# Patient Record
Sex: FEMALE | Race: BLACK OR AFRICAN AMERICAN | NOT HISPANIC OR LATINO | Employment: FULL TIME | URBAN - METROPOLITAN AREA
[De-identification: names, ages, dates, MRNs, and addresses within clinical notes are randomized per-mention and may not be internally consistent; named-entity substitution may affect disease eponyms.]

---

## 2017-01-03 ENCOUNTER — OFFICE VISIT (OUTPATIENT)
Dept: ORTHOPEDIC SURGERY | Facility: CLINIC | Age: 47
End: 2017-01-03

## 2017-01-03 VITALS — BODY MASS INDEX: 27.23 KG/M2 | WEIGHT: 148 LBS | HEIGHT: 62 IN | TEMPERATURE: 98.1 F

## 2017-01-03 DIAGNOSIS — M25.521 RIGHT ELBOW PAIN: ICD-10-CM

## 2017-01-03 DIAGNOSIS — S82.891D ANKLE FRACTURE, RIGHT, CLOSED, WITH ROUTINE HEALING, SUBSEQUENT ENCOUNTER: Primary | ICD-10-CM

## 2017-01-03 PROCEDURE — 73610 X-RAY EXAM OF ANKLE: CPT | Performed by: ORTHOPAEDIC SURGERY

## 2017-01-03 PROCEDURE — 73070 X-RAY EXAM OF ELBOW: CPT | Performed by: ORTHOPAEDIC SURGERY

## 2017-01-03 PROCEDURE — 99024 POSTOP FOLLOW-UP VISIT: CPT | Performed by: ORTHOPAEDIC SURGERY

## 2017-01-03 NOTE — PROGRESS NOTES
"Patient: Teresita Romo  YOB: 1970 46 y.o. female  Medical Record Number: [id number]    Chief Complaint:   Chief Complaint   Patient presents with   • Left Ankle - Follow-up, Pain   • Right Elbow - Follow-up, Pain       History of Present Illness:Teresita Romo is a 46 y.o. female who presents for follow-up of  Right elbow, and left ankle. Overall doing well minimal discomfort in either    Allergies: No Known Allergies    Medications:   Current Outpatient Prescriptions   Medication Sig Dispense Refill   • cetirizine (ZyrTEC) 10 MG tablet TK 1 T PO D PRN  2   • montelukast (SINGULAIR) 10 MG tablet TK 1 T PO ONCE A DAY  1   • Multiple Vitamin (MULTIVITAMIN) tablet Take 1 tablet by mouth Daily.     • valsartan-hydrochlorothiazide (DIOVAN HCT) 160-12.5 MG per tablet Take 1 tablet by mouth Daily.     • estradiol (ESTRACE VAGINAL) 0.1 MG/GM vaginal cream 0.5gms twice/wk after q hs X 1 wk. 1 each 3   • ondansetron ODT (ZOFRAN-ODT) 4 MG disintegrating tablet Take 1 tablet by mouth Every 6 (Six) Hours As Needed for nausea or vomiting. 16 tablet 0   • oxyCODONE-acetaminophen (PERCOCET) 5-325 MG per tablet Take 1 tablet by mouth Every 4 (Four) Hours As Needed for moderate pain (4-6). 16 tablet 0   • sulfamethoxazole-trimethoprim (BACTRIM,SEPTRA) 400-80 MG tablet q 12 hrs 14 tablet 0     No current facility-administered medications for this visit.          The following portions of the patient's history were reviewed and updated as appropriate: allergies, current medications, past family history, past medical history, past social history, past surgical history and problem list.    Review of Systems:   A 14 point review of systems was performed. All systems negative except pertinent positives/negative listed in HPI above    Physical Exam:   Vitals:    01/03/17 1635   Temp: 98.1 °F (36.7 °C)   TempSrc: Temporal Artery    Weight: 148 lb (67.1 kg)   Height: 62\" (157.5 cm)       General: A and O x 3, ASA, " NAD    SCLERA:    Normal    DENTITION:   Normal  No ankle tenderness to palpation  Elbow full ROM no tenderness      Radiology: X-rays AP and lateral of the right elbow ordered and reviewed for evaluation of elbow pain which show some callus about the radial neck consistent with a healed fracture with good alignment.  Callus is new in comparison with previous films from a month ago.    X-rays AP lateral and mortise view of the left ankle were ordered and reviewed for evaluation of ankle fracture which shows good alignment.  There unchanged in comparison with previous films    Assessment/Plan:  Right elbow radial neck fracture healed.  Activity as tolerated    Left ankle fibula fracture I will place her in an Aircast for the next month she can weight-bear as tolerated.  I will see her back in 2 months with repeat x-rays of the left ankle we may consider physical therapy at that time.      Cal Sylvester MD  1/3/2017

## 2017-01-03 NOTE — MR AVS SNAPSHOT
Teresita Romo   1/3/2017 3:45 PM   Office Visit    Dept Phone:  [phone]   Encounter #:  [id number]    Provider:  Cal Sylvester MD   Department:  [facility]                Your Full Care Plan              Your Updated Medication List          This list is accurate as of: 1/3/17  5:32 PM.  Always use your most recent med list.                cetirizine 10 MG tablet   Commonly known as:  zyrTEC       DIOVAN -12.5 MG per tablet   Generic drug:  valsartan-hydrochlorothiazide       estradiol 0.1 MG/GM vaginal cream   Commonly known as:  ESTRACE VAGINAL   0.5gms twice/wk after q hs X 1 wk.       montelukast 10 MG tablet   Commonly known as:  SINGULAIR       multivitamin tablet       ondansetron ODT 4 MG disintegrating tablet   Commonly known as:  ZOFRAN-ODT   Take 1 tablet by mouth Every 6 (Six) Hours As Needed for nausea or vomiting.       oxyCODONE-acetaminophen 5-325 MG per tablet   Commonly known as:  PERCOCET   Take 1 tablet by mouth Every 4 (Four) Hours As Needed for moderate pain (4-6).       sulfamethoxazole-trimethoprim 400-80 MG tablet   Commonly known as:  BACTRIM,SEPTRA   q 12 hrs               We Performed the Following     XR Ankle 3+ View Left     XR Elbow 2 View Right       You Were Diagnosed With        Codes Comments    Ankle fracture, right, closed, with routine healing, subsequent encounter    -  Primary ICD-10-CM: S82.891D  ICD-9-CM: V54.19     Right elbow pain     ICD-10-CM: M25.521  ICD-9-CM: 719.42       Instructions     None    Patient Instructions History      Upcoming Appointments     Visit Type Date Time Department    FOLLOW UP 1/3/2017  3:45 PM [facility]    FOLLOW UP 3/3/2017  1:30 PM [facility]      MyChart Signup     [facility] allows you to send messages to your doctor, view your test results, renew your prescriptions, schedule appointments, and more. To sign up, go to  [email or web address] and click on the Sign Up Now link in the New User? box. Enter your [employer] Activation Code exactly as it appears below along with the last four digits of your Social Security Number and your Date of Birth () to complete the sign-up process. If you do not sign up before the expiration date, you must request a new code.    [employer] Activation Code: [email or web address]  Expires: 2017  5:32 PM    If you have questions, you can email [email or web address] or call [phone] to talk to our [employer] staff. Remember, [employer] is NOT to be used for urgent needs. For medical emergencies, dial 911.               Other Info from Your Visit           Your Appointments     Mar 03, 2017  1:30 PM EST   Follow Up with Cal Sylvester MD   [facility] MED[facility] (--)    [address] [place] [address]   [phone]           Arrive 15 minutes prior to appointment.              Allergies     No Known Allergies      Reason for Visit     Left Ankle - Follow-up, Pain     Right Elbow - Follow-up, Pain           Vital Signs     Temperature Height Weight Last Menstrual Period Body Mass Index Smoking Status    98.1 °F (36.7 °C) (Temporal Artery ) 62\" (157.5 cm) 148 lb (67.1 kg) (LMP Unknown) 27.07 kg/m2 Never Smoker      Problems and Diagnoses Noted     Fracture of right ankle    -  Primary    Right elbow pain          Results     XR Ankle 3+ View Left           XR Elbow 2 View Right               "

## 2017-03-14 ENCOUNTER — OFFICE VISIT (OUTPATIENT)
Dept: ORTHOPEDIC SURGERY | Facility: CLINIC | Age: 47
End: 2017-03-14

## 2017-03-14 VITALS — WEIGHT: 143 LBS | TEMPERATURE: 98.5 F | BODY MASS INDEX: 26.31 KG/M2 | HEIGHT: 62 IN

## 2017-03-14 DIAGNOSIS — S82.891D ANKLE FRACTURE, RIGHT, CLOSED, WITH ROUTINE HEALING, SUBSEQUENT ENCOUNTER: Primary | ICD-10-CM

## 2017-03-14 PROCEDURE — 73610 X-RAY EXAM OF ANKLE: CPT | Performed by: ORTHOPAEDIC SURGERY

## 2017-03-14 PROCEDURE — 99024 POSTOP FOLLOW-UP VISIT: CPT | Performed by: ORTHOPAEDIC SURGERY

## 2017-03-14 NOTE — PROGRESS NOTES
Patient: Teresita Romo  YOB: 1970 46 y.o. female  Medical Record Number: [id number]    Chief Complaints:   Chief Complaint   Patient presents with   • Right Elbow - Follow-up   • Left Ankle - Follow-up       History of Present Illness:Teresita Romo is a 46 y.o. female who presents for follow-up of  right elbow and left ankle.  She is about 3 months out from in the left ankle fracture and right elbow injury.  She has some stiffness within the ankle but overall doing well with minimal pain.  She has been out of her boot for about a month now.    Allergies: No Known Allergies    Medications:   Current Outpatient Prescriptions   Medication Sig Dispense Refill   • cetirizine (ZyrTEC) 10 MG tablet TK 1 T PO D PRN  2   • estradiol (ESTRACE VAGINAL) 0.1 MG/GM vaginal cream 0.5gms twice/wk after q hs X 1 wk. 1 each 3   • montelukast (SINGULAIR) 10 MG tablet TK 1 T PO ONCE A DAY  1   • Multiple Vitamin (MULTIVITAMIN) tablet Take 1 tablet by mouth Daily.     • ondansetron ODT (ZOFRAN-ODT) 4 MG disintegrating tablet Take 1 tablet by mouth Every 6 (Six) Hours As Needed for nausea or vomiting. 16 tablet 0   • oxyCODONE-acetaminophen (PERCOCET) 5-325 MG per tablet Take 1 tablet by mouth Every 4 (Four) Hours As Needed for moderate pain (4-6). 16 tablet 0   • sulfamethoxazole-trimethoprim (BACTRIM,SEPTRA) 400-80 MG tablet q 12 hrs 14 tablet 0   • valsartan-hydrochlorothiazide (DIOVAN HCT) 160-12.5 MG per tablet Take 1 tablet by mouth Daily.       No current facility-administered medications for this visit.          The following portions of the patient's history were reviewed and updated as appropriate: allergies, current medications, past family history, past medical history, past social history, past surgical history and problem list.    Review of Systems:   A 14 point review of systems was performed. All systems negative except pertinent positives/negative listed in HPI above    Physical Exam:  "  Vitals:    03/14/17 1606   Temp: 98.5 °F (36.9 °C)   Weight: 143 lb (64.9 kg)   Height: 62\" (157.5 cm)       General: A and O x 3, ASA, NAD    SCLERA:    Normal    DENTITION:   Normal there is no tenderness about the distal left fibula ankle motion is supple.  Mild tenderness to palpation about the peroneal tendons in the midcalf region.  The elbow shows full range of motion without irritability    Radiology:  Xrays 3views left ankle (ap,lateral, or mortise) were ordered and reviewed for follow-up of left ankle fracture demonstrating a healed distal fibula fracture with good alignment of the mortise.  A comparison to previous films there has been interval healing.      Assessment/Plan:  Healed left ankle fracture activities as tolerated.  She has mild stiffness throughout the tendons have recommended continued stretching exercises    Healed right elbow strain  "

## 2017-06-30 ENCOUNTER — OFFICE VISIT (OUTPATIENT)
Dept: OBSTETRICS AND GYNECOLOGY | Facility: CLINIC | Age: 47
End: 2017-06-30

## 2017-06-30 ENCOUNTER — APPOINTMENT (OUTPATIENT)
Dept: WOMENS IMAGING | Facility: HOSPITAL | Age: 47
End: 2017-06-30

## 2017-06-30 ENCOUNTER — PROCEDURE VISIT (OUTPATIENT)
Dept: OBSTETRICS AND GYNECOLOGY | Facility: CLINIC | Age: 47
End: 2017-06-30

## 2017-06-30 VITALS
BODY MASS INDEX: 26.79 KG/M2 | DIASTOLIC BLOOD PRESSURE: 90 MMHG | SYSTOLIC BLOOD PRESSURE: 136 MMHG | HEART RATE: 76 BPM | HEIGHT: 62 IN | WEIGHT: 145.6 LBS

## 2017-06-30 DIAGNOSIS — Z12.31 VISIT FOR SCREENING MAMMOGRAM: ICD-10-CM

## 2017-06-30 DIAGNOSIS — Z01.419 WELL WOMAN EXAM WITH ROUTINE GYNECOLOGICAL EXAM: Primary | ICD-10-CM

## 2017-06-30 DIAGNOSIS — Z12.31 VISIT FOR SCREENING MAMMOGRAM: Primary | ICD-10-CM

## 2017-06-30 PROCEDURE — 99396 PREV VISIT EST AGE 40-64: CPT | Performed by: OBSTETRICS & GYNECOLOGY

## 2017-06-30 PROCEDURE — 77067 SCR MAMMO BI INCL CAD: CPT | Performed by: OBSTETRICS & GYNECOLOGY

## 2017-06-30 PROCEDURE — 77067 SCR MAMMO BI INCL CAD: CPT | Performed by: RADIOLOGY

## 2017-06-30 RX ORDER — IBUPROFEN 600 MG/1
TABLET ORAL
Refills: 0 | COMMUNITY
Start: 2017-06-20 | End: 2018-08-22

## 2017-06-30 RX ORDER — TRAZODONE HYDROCHLORIDE 50 MG/1
TABLET ORAL
Refills: 1 | COMMUNITY
Start: 2017-04-26

## 2017-06-30 RX ORDER — FLUTICASONE PROPIONATE 50 MCG
SPRAY, SUSPENSION (ML) NASAL
Refills: 0 | COMMUNITY
Start: 2017-04-26

## 2017-06-30 RX ORDER — ASPIRIN 81 MG
TABLET, DELAYED RELEASE (ENTERIC COATED) ORAL
Refills: 0 | COMMUNITY
Start: 2017-06-19 | End: 2018-08-22

## 2017-06-30 RX ORDER — PREDNISONE 10 MG/1
TABLET ORAL
Refills: 0 | COMMUNITY
Start: 2017-06-26 | End: 2018-08-22

## 2017-06-30 NOTE — PROGRESS NOTES
Jeanne Romo is a 46 y.o. female     Cc: Annual    Last annual 2016  Last pap 2014  Last mammogram 2014    History of Present Illness: reports no particular gyn problems or concerns.    The following portions of the patient's history were reviewed and updated as appropriate: allergies, current medications, past family history, past medical history, past social history, past surgical history and problem list.    Review of Systems   Constitutional: Negative for fatigue and fever.   HENT: Negative for congestion.    Eyes: Negative for visual disturbance.   Respiratory: Negative for chest tightness and shortness of breath.    Cardiovascular: Negative for chest pain, palpitations and leg swelling.   Gastrointestinal: Negative for abdominal pain and blood in stool.   Endocrine: Negative for heat intolerance.   Genitourinary: Negative for difficulty urinating, dysuria, frequency, genital sores, hematuria, menstrual problem, pelvic pain, urgency, vaginal bleeding, vaginal discharge and vaginal pain.   Musculoskeletal: Negative for back pain.   Skin: Negative for color change and rash.   Neurological: Negative for headaches.   Psychiatric/Behavioral: Negative for sleep disturbance.       Past Medical History:   Diagnosis Date   • Hypertension      Past Surgical History:   Procedure Laterality Date   • HYSTERECTOMY       OB History      Para Term  AB TAB SAB Ectopic Multiple Living    3 3 3       3        Menstrual History:  OB History      Para Term  AB TAB SAB Ectopic Multiple Living    3 3 3       3         No LMP recorded (lmp unknown). Patient has had a hysterectomy.       Family History   Problem Relation Age of Onset   • Breast cancer Maternal Aunt      History   Smoking Status   • Never Smoker   Smokeless Tobacco   • Not on file     History   Alcohol Use   • Yes       Objective   Physical Exam   Constitutional: She is oriented to  person, place, and time. She appears well-developed and well-nourished.   HENT:   Head: Normocephalic and atraumatic.   Right Ear: External ear normal.   Left Ear: External ear normal.   Nose: Nose normal.   Eyes: EOM are normal. Pupils are equal, round, and reactive to light.   Neck: Normal range of motion. Neck supple. No thyromegaly present.   Cardiovascular: Normal rate, regular rhythm and normal heart sounds.    No murmur heard.  Pulmonary/Chest: Effort normal and breath sounds normal. She has no wheezes. She has no rales. She exhibits no tenderness. Right breast exhibits no inverted nipple, no mass, no nipple discharge, no skin change and no tenderness. Left breast exhibits no inverted nipple, no mass, no nipple discharge, no skin change and no tenderness. There is no breast swelling.   Abdominal: Soft. Bowel sounds are normal. She exhibits no distension and no mass. There is no tenderness. Hernia confirmed negative in the right inguinal area and confirmed negative in the left inguinal area.   Genitourinary: No breast tenderness. Pelvic exam was performed with patient supine. There is no rash, tenderness or lesion on the right labia. There is no rash, tenderness or lesion on the left labia. Right adnexum displays no mass and no tenderness. Left adnexum displays no mass and no tenderness. No erythema, tenderness or bleeding in the vagina. No vaginal discharge found.   Genitourinary Comments: S/P hyst.   Musculoskeletal: Normal range of motion. She exhibits no edema.   Neurological: She is alert and oriented to person, place, and time.   Skin: Skin is warm and dry. No rash noted.   Psychiatric: She has a normal mood and affect. Judgment normal.   Nursing note and vitals reviewed.        Assessment/Plan   Teresita was seen today for annual exam.    Diagnoses and all orders for this visit:    Well woman exam with routine gynecological exam    Visit for screening mammogram      Will continue with annual WWE's, annual  mammography, SBE's, and daily calcium + D

## 2017-07-07 ENCOUNTER — TELEPHONE (OUTPATIENT)
Dept: OBSTETRICS AND GYNECOLOGY | Facility: CLINIC | Age: 47
End: 2017-07-07

## 2017-07-07 NOTE — TELEPHONE ENCOUNTER
Call patient and let her know her mammogram looks good and she needs to continue yearly screening mammograms.

## 2018-08-22 ENCOUNTER — OFFICE VISIT (OUTPATIENT)
Dept: OBSTETRICS AND GYNECOLOGY | Facility: CLINIC | Age: 48
End: 2018-08-22

## 2018-08-22 ENCOUNTER — APPOINTMENT (OUTPATIENT)
Dept: WOMENS IMAGING | Facility: HOSPITAL | Age: 48
End: 2018-08-22

## 2018-08-22 ENCOUNTER — PROCEDURE VISIT (OUTPATIENT)
Dept: OBSTETRICS AND GYNECOLOGY | Facility: CLINIC | Age: 48
End: 2018-08-22

## 2018-08-22 VITALS
BODY MASS INDEX: 25.4 KG/M2 | DIASTOLIC BLOOD PRESSURE: 85 MMHG | HEART RATE: 69 BPM | SYSTOLIC BLOOD PRESSURE: 134 MMHG | WEIGHT: 138 LBS | HEIGHT: 62 IN

## 2018-08-22 DIAGNOSIS — Z12.11 COLON CANCER SCREENING: ICD-10-CM

## 2018-08-22 DIAGNOSIS — Z01.419 PAP SMEAR, AS PART OF ROUTINE GYNECOLOGICAL EXAMINATION: Primary | ICD-10-CM

## 2018-08-22 DIAGNOSIS — Z12.31 VISIT FOR SCREENING MAMMOGRAM: Primary | ICD-10-CM

## 2018-08-22 DIAGNOSIS — N95.1 MENOPAUSAL SYMPTOMS: ICD-10-CM

## 2018-08-22 DIAGNOSIS — Z01.419 ENCOUNTER FOR GYNECOLOGICAL EXAMINATION WITHOUT ABNORMAL FINDING: ICD-10-CM

## 2018-08-22 LAB — HEMOCCULT STL QL IA: NEGATIVE

## 2018-08-22 PROCEDURE — 77067 SCR MAMMO BI INCL CAD: CPT | Performed by: OBSTETRICS & GYNECOLOGY

## 2018-08-22 PROCEDURE — 82274 ASSAY TEST FOR BLOOD FECAL: CPT | Performed by: OBSTETRICS & GYNECOLOGY

## 2018-08-22 PROCEDURE — 99396 PREV VISIT EST AGE 40-64: CPT | Performed by: OBSTETRICS & GYNECOLOGY

## 2018-08-22 PROCEDURE — 77067 SCR MAMMO BI INCL CAD: CPT | Performed by: RADIOLOGY

## 2018-08-22 RX ORDER — LOSARTAN POTASSIUM AND HYDROCHLOROTHIAZIDE 12.5; 5 MG/1; MG/1
TABLET ORAL DAILY
Refills: 1 | COMMUNITY
Start: 2018-07-27 | End: 2020-06-24

## 2018-08-22 NOTE — PROGRESS NOTES
Jeanne Romo is a 47 y.o. female  3, Para 3 AB 0, Living 3.  Last annual 1 year, last pap 3 years, last mammogram today, last colonoscopy 0.  Cc: Annual exam  History of Present Illness  Is complaining of hot flashes and other menopausal symptoms.  She had a short-term usage of HRT.  Discontinued its use.  Does complain of some vaginal dryness with some relief with lubricants.  Not currently interested in HRT  The following portions of the patient's history were reviewed and updated as appropriate: allergies, current medications, past family history, past medical history, past social history, past surgical history and problem list.    Review of Systems   Genitourinary:        See history of present illness   All other systems reviewed and are negative.        Past Medical History:   Diagnosis Date   • Asthma    • Hypertension      Menstrual History:  OB History      Para Term  AB Living    3 3 3     3    SAB TAB Ectopic Molar Multiple Live Births                        Menarche age: 11  No LMP recorded (lmp unknown). Patient has had a hysterectomy.       Past Surgical History:   Procedure Laterality Date   • BILATERAL OOPHORECTOMY     • TOTAL ABDOMINAL HYSTERECTOMY       OB History      Para Term  AB Living    3 3 3     3    SAB TAB Ectopic Molar Multiple Live Births                       Family History   Problem Relation Age of Onset   • Breast cancer Maternal Aunt      History   Smoking Status   • Never Smoker   Smokeless Tobacco   • Never Used     History   Alcohol Use   • Yes     Comment: social     Health Maintenance   Topic Date Due   • ANNUAL PHYSICAL  1971   • TDAP/TD VACCINES (1 - Tdap) 1987   • PAP SMEAR  2017   • COLONOSCOPY  2018   • INFLUENZA VACCINE  2018   • MAMMOGRAM  2019       Current Outpatient Prescriptions:   •  cetirizine (ZyrTEC) 10 MG tablet, TK 1 T PO D PRN, Disp: , Rfl: 2  •  fluticasone  "(FLONASE) 50 MCG/ACT nasal spray, U 1 SPRAY IEN D, Disp: , Rfl: 0  •  losartan-hydrochlorothiazide (HYZAAR) 50-12.5 MG per tablet, Take  by mouth Daily., Disp: , Rfl: 1  •  montelukast (SINGULAIR) 10 MG tablet, TK 1 T PO ONCE A DAY, Disp: , Rfl: 1  •  traZODone (DESYREL) 50 MG tablet, TK 1 T PO QD HS, Disp: , Rfl: 1  Sexual History: Active  STD: Negative     Objective   Vitals:    08/22/18 1346   BP: 134/85   Pulse: 69   Weight: 62.6 kg (138 lb)   Height: 157.5 cm (62\")     Physical Exam   Constitutional: She is oriented to person, place, and time. She appears well-developed and well-nourished.   HENT:   Head: Normocephalic.   Eyes: Pupils are equal, round, and reactive to light.   Neck: Normal range of motion. No thyromegaly present.   Cardiovascular: Normal rate, regular rhythm, normal heart sounds and intact distal pulses.    Pulmonary/Chest: Effort normal and breath sounds normal. No respiratory distress. She exhibits no tenderness. Right breast exhibits no inverted nipple, no mass, no nipple discharge, no skin change and no tenderness. Left breast exhibits no inverted nipple, no mass, no nipple discharge, no skin change and no tenderness. Breasts are symmetrical.   Abdominal: Soft. Bowel sounds are normal. Hernia confirmed negative in the right inguinal area and confirmed negative in the left inguinal area.   Genitourinary: Rectum normal and vagina normal. Rectal exam shows no external hemorrhoid, no internal hemorrhoid, no fissure, no mass, no tenderness, anal tone normal and guaiac negative stool. No breast tenderness or discharge. Pelvic exam was performed with patient supine. There is no rash, tenderness, lesion or injury on the right labia. There is no rash, tenderness, lesion or injury on the left labia. Right adnexum displays no mass, no tenderness and no fullness. Left adnexum displays no mass, no tenderness and no fullness.   Genitourinary Comments: Cervix uterus and adnexa are surgically absent "   Lymphadenopathy:     She has no cervical adenopathy.        Right: No inguinal adenopathy present.        Left: No inguinal adenopathy present.   Neurological: She is alert and oriented to person, place, and time. She has normal reflexes.   Skin: Skin is warm and dry.   Psychiatric: She has a normal mood and affect. Her behavior is normal. Judgment and thought content normal.         Assessment/Plan   Teresita was seen today for gynecologic exam.    Diagnoses and all orders for this visit:    Pap smear, as part of routine gynecological examination  -     IGP, Apt HPV,rfx 16 / 18,45    Colon cancer screening  -     POC FECAL OCCULT BLOOD BY IMMUNOASSAY    Menopausal symptoms    Encounter for gynecological examination without abnormal finding    Patient was counseled about breast self-examination, mammograms, colonoscopy, menopausal hormone replacement (declined)

## 2018-08-25 LAB
CYTOLOGIST CVX/VAG CYTO: NORMAL
CYTOLOGY CVX/VAG DOC THIN PREP: NORMAL
DX ICD CODE: NORMAL
HIV 1 & 2 AB SER-IMP: NORMAL
HPV I/H RISK 4 DNA CVX QL PROBE+SIG AMP: NEGATIVE
OTHER STN SPEC: NORMAL
PATH REPORT.FINAL DX SPEC: NORMAL
STAT OF ADQ CVX/VAG CYTO-IMP: NORMAL

## 2018-08-27 ENCOUNTER — TELEPHONE (OUTPATIENT)
Dept: OBSTETRICS AND GYNECOLOGY | Facility: CLINIC | Age: 48
End: 2018-08-27

## 2018-08-27 NOTE — TELEPHONE ENCOUNTER
----- Message from Edwin Goodman MD sent at 8/27/2018  2:20 PM EDT -----  Tell the patient her mammogram was normal  ----- Message -----  From: Interface, Scans Incoming  Sent: 8/27/2018   1:58 PM  To: Edwin Goodman MD

## 2019-11-15 ENCOUNTER — PROCEDURE VISIT (OUTPATIENT)
Dept: OBSTETRICS AND GYNECOLOGY | Facility: CLINIC | Age: 49
End: 2019-11-15

## 2019-11-15 ENCOUNTER — APPOINTMENT (OUTPATIENT)
Dept: WOMENS IMAGING | Facility: HOSPITAL | Age: 49
End: 2019-11-15

## 2019-11-15 DIAGNOSIS — Z12.31 VISIT FOR SCREENING MAMMOGRAM: Primary | ICD-10-CM

## 2019-11-15 PROCEDURE — 77067 SCR MAMMO BI INCL CAD: CPT | Performed by: RADIOLOGY

## 2019-11-15 PROCEDURE — 77067 SCR MAMMO BI INCL CAD: CPT | Performed by: OBSTETRICS & GYNECOLOGY

## 2019-11-15 PROCEDURE — 77063 BREAST TOMOSYNTHESIS BI: CPT | Performed by: RADIOLOGY

## 2019-11-15 PROCEDURE — 77063 BREAST TOMOSYNTHESIS BI: CPT | Performed by: OBSTETRICS & GYNECOLOGY

## 2019-12-10 ENCOUNTER — TELEPHONE (OUTPATIENT)
Dept: OBSTETRICS AND GYNECOLOGY | Facility: CLINIC | Age: 49
End: 2019-12-10

## 2019-12-10 NOTE — TELEPHONE ENCOUNTER
----- Message from Mariposa Quinn MD sent at 11/20/2019  4:47 PM EST -----  Please let patient know she had a benign mammogram. Follow up in one year.    (based on media tab) Letter with mammogram results was mailed by mammogram office on 11/18/19

## 2020-01-07 ENCOUNTER — TELEPHONE (OUTPATIENT)
Dept: OBSTETRICS AND GYNECOLOGY | Facility: CLINIC | Age: 50
End: 2020-01-07

## 2020-06-24 ENCOUNTER — OFFICE VISIT (OUTPATIENT)
Dept: OBSTETRICS AND GYNECOLOGY | Facility: CLINIC | Age: 50
End: 2020-06-24

## 2020-06-24 VITALS
SYSTOLIC BLOOD PRESSURE: 122 MMHG | DIASTOLIC BLOOD PRESSURE: 82 MMHG | HEART RATE: 105 BPM | BODY MASS INDEX: 26.68 KG/M2 | HEIGHT: 62 IN | WEIGHT: 145 LBS

## 2020-06-24 DIAGNOSIS — Z01.419 WELL WOMAN EXAM WITH ROUTINE GYNECOLOGICAL EXAM: Primary | ICD-10-CM

## 2020-06-24 PROCEDURE — 99396 PREV VISIT EST AGE 40-64: CPT | Performed by: OBSTETRICS & GYNECOLOGY

## 2020-06-24 RX ORDER — CANDESARTAN CILEXETIL AND HYDROCHLOROTHIAZIDE 32; 12.5 MG/1; MG/1
TABLET ORAL DAILY
COMMUNITY
Start: 2020-06-08

## 2020-06-24 RX ORDER — ESTRADIOL 10 UG/1
INSERT VAGINAL
Qty: 14 TABLET | Refills: 7 | Status: SHIPPED | OUTPATIENT
Start: 2020-06-24 | End: 2021-06-13

## 2020-06-24 NOTE — PROGRESS NOTES
Chief Complaint   Patient presents with   • Annual Exam     pap:  NILM -HPV mammo: , colonoscopy:         Teresita Romo is a 49 y.o.  who presents for an annual examination. She reports vaginal dryness, decreased arousal and dyspareunia since her oophorectomy in . Reports the dryness occasionally leads to small tears/sores that resolve on their own. She is also experiencing VMS daily that are worse at night. She has tried estrogen gel, patch and pills without relief of symptoms.     Pap history:  Last pap: Aug 2018 NIL, HPV negative  Prior abnormal paps: no  DXA:  no  Colonoscopy:  yes, 2019  Mammogram: 2019  STDs  Sexually active: yes  History of STDs: no  Menopause:  Hysterectomy   Bleeding since? no  Vasomotor symptoms: yes  HRT: not currently - was on it previously. Has tried the gel, patch, pills in systemic forms.    Incontinence?  no  Dyspareunia: yes        Is patient's family or personal history significant for any risks for BRCA? no    Past Medical History:   Diagnosis Date   • Asthma    • Hypertension      Past Surgical History:   Procedure Laterality Date   • BILATERAL OOPHORECTOMY     •  SECTION     • TOTAL ABDOMINAL HYSTERECTOMY       OB History    Para Term  AB Living   3 3 3     3   SAB TAB Ectopic Molar Multiple Live Births             3      # Outcome Date GA Lbr Bang/2nd Weight Sex Delivery Anes PTL Lv   3 Term      CS-LTranv   EMILIANO   2 Term      Vag-Spont   EMILIANO   1 Term      Vag-Spont   EMILIANO     Social History     Tobacco Use   • Smoking status: Never Smoker   • Smokeless tobacco: Never Used   Substance Use Topics   • Alcohol use: Yes     Comment: social   • Drug use: No     Family History   Problem Relation Age of Onset   • Breast cancer Maternal Aunt         30's or 40's    • Ovarian cancer Neg Hx    • Uterine cancer Neg Hx    • Colon cancer Neg Hx    • Deep vein thrombosis Neg Hx    • Pulmonary embolism Neg Hx      Current  "Outpatient Medications on File Prior to Visit   Medication Sig Dispense Refill   • candesartan-hydrochlorothiazide (ATACAND HCT) 32-12.5 MG per tablet Take  by mouth Daily.     • cetirizine (ZyrTEC) 10 MG tablet TK 1 T PO D PRN  2   • fluticasone (FLONASE) 50 MCG/ACT nasal spray U 1 SPRAY IEN D  0   • montelukast (SINGULAIR) 10 MG tablet TK 1 T PO ONCE A DAY  1   • traZODone (DESYREL) 50 MG tablet TK 1 T PO QD HS  1   • [DISCONTINUED] losartan-hydrochlorothiazide (HYZAAR) 50-12.5 MG per tablet Take  by mouth Daily.  1     No current facility-administered medications on file prior to visit.      No Known Allergies     Review of Systems    OBJECTIVE:   Vitals:    06/24/20 1538   BP: 122/82   Pulse: 105   Weight: 65.8 kg (145 lb)   Height: 157.5 cm (62\")      Physical Exam    ASSESSMENT/PLAN:  Annual well woman visit:  Cervical cancer screening:    Reports no h/o cervical dysplasia   The patient is s/p hysterectomy, likely completed pap smear sreening.    Screening guidelines discussed with patient  Breast cancer screening:    Mammogram due Nov 2020   Breast self awareness encouraged  Colonscopy:   Due in one year (repeat due to polyps)  DXA   Low risk FRAX (pt has had steroid exposure and RA but based on age, still low risk)  Family history    does not demonstrate need for genetics referral   Healthy lifestyle counseling:   continue current healthy lifestyle patterns    BMI Counseling  Her BMI is classified as BMI 25.0-29.9        Classification: overweight.      WAYNE of Menopause:   Reviewed use of vaginal moisturizers, lubricants.  Patient would like to try vagifem. Counseled on risks/benefits/details of use    "

## 2021-01-22 ENCOUNTER — PROCEDURE VISIT (OUTPATIENT)
Dept: OBSTETRICS AND GYNECOLOGY | Facility: CLINIC | Age: 51
End: 2021-01-22

## 2021-01-22 ENCOUNTER — APPOINTMENT (OUTPATIENT)
Dept: WOMENS IMAGING | Facility: HOSPITAL | Age: 51
End: 2021-01-22

## 2021-01-22 DIAGNOSIS — Z12.31 VISIT FOR SCREENING MAMMOGRAM: Primary | ICD-10-CM

## 2021-01-22 PROCEDURE — 77067 SCR MAMMO BI INCL CAD: CPT | Performed by: OBSTETRICS & GYNECOLOGY

## 2021-01-22 PROCEDURE — 77063 BREAST TOMOSYNTHESIS BI: CPT | Performed by: OBSTETRICS & GYNECOLOGY

## 2021-01-22 PROCEDURE — 77067 SCR MAMMO BI INCL CAD: CPT | Performed by: RADIOLOGY

## 2021-01-22 PROCEDURE — 77063 BREAST TOMOSYNTHESIS BI: CPT | Performed by: RADIOLOGY

## 2022-01-28 ENCOUNTER — APPOINTMENT (OUTPATIENT)
Dept: WOMENS IMAGING | Facility: HOSPITAL | Age: 52
End: 2022-01-28

## 2022-01-28 ENCOUNTER — PROCEDURE VISIT (OUTPATIENT)
Dept: OBSTETRICS AND GYNECOLOGY | Facility: CLINIC | Age: 52
End: 2022-01-28

## 2022-01-28 DIAGNOSIS — Z12.31 VISIT FOR SCREENING MAMMOGRAM: Primary | ICD-10-CM

## 2022-01-28 PROCEDURE — 77063 BREAST TOMOSYNTHESIS BI: CPT | Performed by: RADIOLOGY

## 2022-01-28 PROCEDURE — 77063 BREAST TOMOSYNTHESIS BI: CPT | Performed by: OBSTETRICS & GYNECOLOGY

## 2022-01-28 PROCEDURE — 77067 SCR MAMMO BI INCL CAD: CPT | Performed by: RADIOLOGY

## 2022-01-28 PROCEDURE — 77067 SCR MAMMO BI INCL CAD: CPT | Performed by: OBSTETRICS & GYNECOLOGY

## 2022-05-09 ENCOUNTER — OFFICE VISIT (OUTPATIENT)
Dept: OBSTETRICS AND GYNECOLOGY | Facility: CLINIC | Age: 52
End: 2022-05-09

## 2022-05-09 VITALS
HEART RATE: 81 BPM | HEIGHT: 62 IN | SYSTOLIC BLOOD PRESSURE: 127 MMHG | BODY MASS INDEX: 26.5 KG/M2 | WEIGHT: 144 LBS | DIASTOLIC BLOOD PRESSURE: 81 MMHG

## 2022-05-09 DIAGNOSIS — Z01.419 WELL WOMAN EXAM WITH ROUTINE GYNECOLOGICAL EXAM: Primary | ICD-10-CM

## 2022-05-09 PROBLEM — Z12.11 COLON CANCER SCREENING: Status: ACTIVE | Noted: 2021-01-18

## 2022-05-09 PROBLEM — Z86.010 HISTORY OF COLON POLYPS: Status: ACTIVE | Noted: 2021-02-22

## 2022-05-09 PROCEDURE — 99396 PREV VISIT EST AGE 40-64: CPT | Performed by: OBSTETRICS & GYNECOLOGY

## 2022-05-09 RX ORDER — DEXTROAMPHETAMINE SACCHARATE, AMPHETAMINE ASPARTATE, DEXTROAMPHETAMINE SULFATE AND AMPHETAMINE SULFATE 7.5; 7.5; 7.5; 7.5 MG/1; MG/1; MG/1; MG/1
TABLET ORAL
COMMUNITY
Start: 2022-05-05

## 2022-05-09 RX ORDER — RESVER/WINE/BFL/GRPSD/PC/C/POM 200MG-60MG
1 CAPSULE ORAL DAILY
COMMUNITY
Start: 2022-03-29

## 2022-05-09 RX ORDER — ESTRADIOL 10 UG/1
INSERT VAGINAL
Qty: 14 TABLET | Refills: 7 | Status: SHIPPED | OUTPATIENT
Start: 2022-05-09 | End: 2022-12-01

## 2022-05-09 RX ORDER — ZINC GLUCONATE 50 MG
1 TABLET ORAL DAILY
COMMUNITY
Start: 2022-02-15

## 2022-05-09 RX ORDER — ALBUTEROL SULFATE 90 UG/1
AEROSOL, METERED RESPIRATORY (INHALATION)
COMMUNITY
Start: 2022-04-06

## 2022-05-09 RX ORDER — CETIRIZINE HYDROCHLORIDE 5 MG/1
5 TABLET ORAL DAILY
COMMUNITY
End: 2022-12-01

## 2022-05-09 RX ORDER — DEXTROAMPHETAMINE SACCHARATE, AMPHETAMINE ASPARTATE, DEXTROAMPHETAMINE SULFATE AND AMPHETAMINE SULFATE 2.5; 2.5; 2.5; 2.5 MG/1; MG/1; MG/1; MG/1
TABLET ORAL
COMMUNITY
Start: 2022-05-05

## 2022-05-09 NOTE — PROGRESS NOTES
Chief Complaint   Patient presents with   • Annual Exam     Pt presents today for annual exam. Last annual 2020 last pap smear 2018, last mammogram- 2022        Teresita Romo is a 51 y.o.  who presents for an annual examination.     Reports some lesions on the skin near her buttock/anal area.  Last was .  Burning sensation. Has had before and reports testing was negative.  They improve with A&D ointment.  Notices them every 6 months/8months.  Looks like a red [place].  No blisters.  Reports negative serum and negative swab for HSV with Dr. Wilson    Pap history:  Last pap: Aug 2018 NIL, HPV negative  Prior abnormal paps: no  DXA:  no  Colonoscopy:  yes, 2021  Mammogram: 2022  STDs  Sexually active: yes  History of STDs: no  Menopause:  Hysterectomy   Bleeding since? no  Vasomotor symptoms: yes  HRT: not currently - was on it previously. Has tried the gel, patch, pills in systemic forms.    Incontinence?  no  Dyspareunia: yes - still having dryness.          Is patient's family or personal history significant for any risks for BRCA? no    Past Medical History:   Diagnosis Date   • Asthma    • Hypertension      Past Surgical History:   Procedure Laterality Date   • BILATERAL OOPHORECTOMY     •  SECTION     • TOTAL ABDOMINAL HYSTERECTOMY      endometriosis, fibroids     OB History    Para Term  AB Living   3 3 3     3   SAB IAB Ectopic Molar Multiple Live Births             3      # Outcome Date GA Lbr Bang/2nd Weight Sex Delivery Anes PTL Lv   3 Term      CS-LTranv   EMILIANO   2 Term      Vag-Spont   EMILIANO   1 Term      Vag-Spont   EMILIANO     Social History     Tobacco Use   • Smoking status: Never Smoker   • Smokeless tobacco: Never Used   Substance Use Topics   • Alcohol use: Yes     Comment: social   • Drug use: No     Family History   Problem Relation Age of Onset   • Breast cancer Paternal Aunt         30-40   • Ovarian cancer Neg Hx    • Uterine  "cancer Neg Hx    • Colon cancer Neg Hx    • Deep vein thrombosis Neg Hx    • Pulmonary embolism Neg Hx      Current Outpatient Medications on File Prior to Visit   Medication Sig Dispense Refill   • albuterol sulfate  (90 Base) MCG/ACT inhaler INHALE 2 PUFFS INTO THE LUNGS EVERY 4 HOURS AS NEEDED FOR WHEEZING OR SHORTNESS OF BREATH     • amphetamine-dextroamphetamine (ADDERALL) 10 MG tablet      • amphetamine-dextroamphetamine (ADDERALL) 30 MG tablet      • candesartan-hydrochlorothiazide (ATACAND HCT) 32-12.5 MG per tablet Take  by mouth Daily.     • cetirizine (ZyrTEC) 10 MG tablet TK 1 T PO D PRN  2   • cetirizine (zyrTEC) 5 MG tablet Take 5 mg by mouth Daily.     • D-5000 125 MCG (5000 UT) tablet Take 1 tablet by mouth Daily.     • fluticasone (FLONASE) 50 MCG/ACT nasal spray U 1 SPRAY IEN D  0   • montelukast (SINGULAIR) 10 MG tablet TK 1 T PO ONCE A DAY  1   • traZODone (DESYREL) 50 MG tablet TK 1 T PO QD HS  1   • Zinc 50 MG tablet Take 1 tablet by mouth Daily.       No current facility-administered medications on file prior to visit.     Allergies   Allergen Reactions   • Codeine Itching        Review of Systems  See HPI    OBJECTIVE:   Vitals:    05/09/22 1118   BP: 127/81   Pulse: 81   Weight: 65.3 kg (144 lb)   Height: 157.5 cm (62.01\")      Physical Exam   Constitutional: She is oriented to person, place, and time. She appears well-developed and well-nourished. No distress.   HENT:   Head: Normocephalic and atraumatic.   Eyes: No scleral icterus.   Neck: No thyromegaly present.   Cardiovascular: Normal rate and regular rhythm. Exam reveals no gallop and no friction rub.   No murmur heard.  Pulmonary/Chest: Effort normal and breath sounds normal. No respiratory distress. She has no wheezes. She has no rales. She exhibits no tenderness. Right breast exhibits no inverted nipple. Left breast exhibits no inverted nipple. No breast swelling. Breasts are symmetrical.   Abdominal: Soft. Bowel sounds are " normal. She exhibits no distension. There is no abdominal tenderness. There is no guarding.   Genitourinary: Vagina normal. There is no rash, tenderness, lesion, injury or Bartholin's cyst on the right labia. There is no rash, tenderness, lesion, injury or Bartholin's cyst on the left labia. Uterus is absent.   Cervix is absent. No vaginal discharge found.   Musculoskeletal: No deformity.   Neurological: She is alert and oriented to person, place, and time.   Skin: Skin is warm and dry. She is not diaphoretic.   Psychiatric: Her speech is normal and behavior is normal. Judgment and thought content normal.       ASSESSMENT/PLAN:  Annual well woman visit:  Cervical cancer screening:    Reports no h/o cervical dysplasia   The patient is s/p hysterectomy, likely completed pap smear sreening.    Screening guidelines discussed with patient  Breast cancer screening:    Mammogram due Jan 2023   Breast self awareness encouraged  Colonscopy:   2021 - good for 3 years  DXA   Low risk FRAX (pt has had steroid exposure and RA but based on age, still low risk)  Family history    does not demonstrate need for genetics referral   Healthy lifestyle counseling:   continue current healthy lifestyle patterns   - no lesions seen this time on exam and negative prior HSV testing per patient.  Will call if more frequent    BMI Counseling  Her BMI is classified as BMI 25.0-29.9        Classification: overweight.      WAYNE of Menopause:   Reviewed use of vaginal moisturizers, lubricants.  Patient would like to try vagifem again as this worked previously. Counseled on risks/benefits/details of use

## 2022-08-17 ENCOUNTER — TELEPHONE (OUTPATIENT)
Dept: OBSTETRICS AND GYNECOLOGY | Facility: CLINIC | Age: 52
End: 2022-08-17

## 2022-11-29 ENCOUNTER — TELEPHONE (OUTPATIENT)
Dept: OBSTETRICS AND GYNECOLOGY | Facility: CLINIC | Age: 52
End: 2022-11-29

## 2022-11-29 NOTE — TELEPHONE ENCOUNTER
Patient is calling with concerns due to lump on left breast. Pt states she would like to get mammogram if possible and be seen for it. Last mammogram was 1/28/22, next mammogram is scheduled for 5/15/23.     Please adviseElza

## 2022-11-29 NOTE — TELEPHONE ENCOUNTER
Elza- I would recommend she is seen so we can make sure we are ordering the correct test.  If Meghan can see her before Friday, that is great. Otherwise, you can add her to my schedule on Friday. Thanks!

## 2022-11-30 NOTE — TELEPHONE ENCOUNTER
Please see previous message from Dr. Quinn. Patient has lump on left breast that she is concerned about, per Dr. Quinn pt should be seen this week. Are you able to see patient at all Thursday 12/1?    Please adviseElza

## 2022-12-01 ENCOUNTER — OFFICE VISIT (OUTPATIENT)
Dept: OBSTETRICS AND GYNECOLOGY | Facility: CLINIC | Age: 52
End: 2022-12-01

## 2022-12-01 ENCOUNTER — TELEPHONE (OUTPATIENT)
Dept: OBSTETRICS AND GYNECOLOGY | Facility: CLINIC | Age: 52
End: 2022-12-01

## 2022-12-01 VITALS
HEIGHT: 62 IN | DIASTOLIC BLOOD PRESSURE: 78 MMHG | WEIGHT: 141.6 LBS | BODY MASS INDEX: 26.06 KG/M2 | SYSTOLIC BLOOD PRESSURE: 124 MMHG | HEART RATE: 69 BPM

## 2022-12-01 DIAGNOSIS — N63.0 BREAST MASS IN FEMALE: Primary | ICD-10-CM

## 2022-12-01 DIAGNOSIS — R22.32 MASS OF LEFT AXILLA: ICD-10-CM

## 2022-12-01 PROCEDURE — 99213 OFFICE O/P EST LOW 20 MIN: CPT | Performed by: NURSE PRACTITIONER

## 2022-12-01 RX ORDER — AZELASTINE HYDROCHLORIDE 137 UG/1
SPRAY, METERED NASAL
COMMUNITY
Start: 2022-11-05

## 2022-12-01 NOTE — TELEPHONE ENCOUNTER
Pt was in office when we scheduled her DX Bilat Mammo & Left Limited US at [facility] 12/13/22 @ 2pm.  SR

## 2022-12-01 NOTE — PROGRESS NOTES
"Chief Complaint   Patient presents with   • Breast Problem     Pt c/o lump in left breast x 1 month. Pt states when touched is painful        SUBJECTIVE:     Teresita Romo is a 51 y.o.  who presents with c/o left breast/axillary mass for 1 month. This has not changed any in size since she first noticed. This mass is tender.  This is a new problem. Last mammogram 2022 was normal. One prior abnormal mammogram with f/u WNL. Paternal aunt with breast cancer. Denies recent vaccinations.     This is my first time meeting Teresita Romo  She is a patient of Dr. Lemuss.     Past Medical History:   Diagnosis Date   • Asthma    • Hypertension       Past Surgical History:   Procedure Laterality Date   • BILATERAL OOPHORECTOMY     •  SECTION     • TOTAL ABDOMINAL HYSTERECTOMY  2002    endometriosis, fibroids        Review of Systems   Constitutional: Negative for chills, fatigue and fever.   Genitourinary:        + left breast mass  + left breast pain  - nipple drainage  - dimpling or puckering of breast tissue       OBJECTIVE:   Vitals:    22 0918   BP: 124/78   Pulse: 69   Weight: 64.2 kg (141 lb 9.6 oz)   Height: 157.5 cm (62\")        Physical Exam  Constitutional:       General: She is not in acute distress.     Appearance: Normal appearance. She is not ill-appearing, toxic-appearing or diaphoretic.   Genitourinary:   Breasts:     Breasts are symmetrical.      Right: Present. No swelling, bleeding, inverted nipple, mass, nipple discharge, skin change, tenderness or breast implant.      Left: Present. Mass and tenderness present. No swelling, bleeding, inverted nipple, nipple discharge, skin change or breast implant.   Cardiovascular:      Rate and Rhythm: Normal rate.   Pulmonary:      Effort: Pulmonary effort is normal.   Chest:       Musculoskeletal:         General: Normal range of motion.      Cervical back: Normal range of motion.   Lymphadenopathy:      Upper Body:      Right " upper body: No supraclavicular or axillary adenopathy.      Left upper body: No supraclavicular or axillary adenopathy.   Neurological:      General: No focal deficit present.      Mental Status: She is alert and oriented to person, place, and time.      Cranial Nerves: No cranial nerve deficit.   Skin:     General: Skin is warm and dry.   Psychiatric:         Mood and Affect: Mood normal.         Behavior: Behavior normal.         Thought Content: Thought content normal.         Judgment: Judgment normal.   Vitals and nursing note reviewed.         Assessment/Plan    Diagnoses and all orders for this visit:    1. Breast mass in female (Primary)  -     Mammo Diagnostic Digital Tomosynthesis Left With CAD; Future  -     US breast left limited; Future    2. Mass of left axilla  -     Mammo Diagnostic Digital Tomosynthesis Left With CAD; Future  -     US breast left limited; Future      Approximate pea size, mobile, tender mass located at the end of left breast near axilla. I am only able to feel this when the pt is sitting up right  Will complete left breast imaging to further evaluate, she was scheduled while in the office today  Reassured pt    Follow up: PRN and annually     I spent 21 minutes caring for Teresita on this date of service. This time includes time spent by me in the following activities: preparing for the visit, reviewing tests, obtaining and/or reviewing a separately obtained history, performing a medically appropriate examination and/or evaluation, counseling and educating the patient/family/caregiver, ordering medications, tests, or procedures, referring and communicating with other health care professionals and documenting information in the medical record    Meghan Sylvester, DELONTE  12/1/2022  09:38 EST

## 2022-12-13 ENCOUNTER — APPOINTMENT (OUTPATIENT)
Dept: WOMENS IMAGING | Facility: HOSPITAL | Age: 52
End: 2022-12-13

## 2022-12-13 PROCEDURE — 77061 BREAST TOMOSYNTHESIS UNI: CPT | Performed by: RADIOLOGY

## 2022-12-13 PROCEDURE — G0279 TOMOSYNTHESIS, MAMMO: HCPCS | Performed by: RADIOLOGY

## 2022-12-13 PROCEDURE — 77065 DX MAMMO INCL CAD UNI: CPT | Performed by: RADIOLOGY

## 2022-12-13 PROCEDURE — 76642 ULTRASOUND BREAST LIMITED: CPT | Performed by: RADIOLOGY

## 2022-12-19 DIAGNOSIS — N63.0 BREAST MASS IN FEMALE: ICD-10-CM

## 2022-12-19 DIAGNOSIS — R22.32 MASS OF LEFT AXILLA: ICD-10-CM

## 2022-12-19 NOTE — TELEPHONE ENCOUNTER
Pt called in to get results from her recent DX Mammo & US.  She was informed of stable findings and the request to follow up in 4 mths with a Left Breast Limited US in April 2023.    We also moved her screening mammogram up to 2/3/23 in office.  SR

## 2023-01-26 DIAGNOSIS — R59.9 ENLARGED LYMPH NODE: ICD-10-CM

## 2023-01-26 DIAGNOSIS — Z09 FOLLOW-UP EXAM, 3-6 MONTHS SINCE PREVIOUS EXAM: Primary | ICD-10-CM

## 2023-03-01 ENCOUNTER — TELEPHONE (OUTPATIENT)
Dept: OBSTETRICS AND GYNECOLOGY | Facility: CLINIC | Age: 53
End: 2023-03-01
Payer: COMMERCIAL

## 2023-03-01 DIAGNOSIS — R59.9 LYMPH NODE ENLARGEMENT: ICD-10-CM

## 2023-03-01 DIAGNOSIS — Z09 FOLLOW-UP EXAM, 3-6 MONTHS SINCE PREVIOUS EXAM: Primary | ICD-10-CM

## 2023-03-01 NOTE — TELEPHONE ENCOUNTER
Pt worked with [facility] and scheduled her 6 mfth follow up Left Limited US on 4/13/23 @ 10am.

## 2023-04-13 ENCOUNTER — APPOINTMENT (OUTPATIENT)
Dept: WOMENS IMAGING | Facility: HOSPITAL | Age: 53
End: 2023-04-13
Payer: COMMERCIAL

## 2023-04-13 PROCEDURE — 77062 BREAST TOMOSYNTHESIS BI: CPT | Performed by: RADIOLOGY

## 2023-04-13 PROCEDURE — 76642 ULTRASOUND BREAST LIMITED: CPT | Performed by: RADIOLOGY

## 2023-04-13 PROCEDURE — 77066 DX MAMMO INCL CAD BI: CPT | Performed by: RADIOLOGY

## 2023-04-13 PROCEDURE — G0279 TOMOSYNTHESIS, MAMMO: HCPCS | Performed by: RADIOLOGY

## 2023-04-14 DIAGNOSIS — Z09 FOLLOW-UP EXAM, 3-6 MONTHS SINCE PREVIOUS EXAM: ICD-10-CM

## 2023-04-14 DIAGNOSIS — R59.9 ENLARGED LYMPH NODE: ICD-10-CM

## 2023-09-08 ENCOUNTER — TELEPHONE (OUTPATIENT)
Dept: OBSTETRICS AND GYNECOLOGY | Facility: CLINIC | Age: 53
End: 2023-09-08
Payer: COMMERCIAL

## 2024-04-18 ENCOUNTER — OFFICE VISIT (OUTPATIENT)
Dept: OBSTETRICS AND GYNECOLOGY | Facility: CLINIC | Age: 54
End: 2024-04-18
Payer: COMMERCIAL

## 2024-04-18 ENCOUNTER — TELEPHONE (OUTPATIENT)
Dept: OBSTETRICS AND GYNECOLOGY | Facility: CLINIC | Age: 54
End: 2024-04-18

## 2024-04-18 VITALS
SYSTOLIC BLOOD PRESSURE: 122 MMHG | BODY MASS INDEX: 25.76 KG/M2 | DIASTOLIC BLOOD PRESSURE: 75 MMHG | HEIGHT: 62 IN | HEART RATE: 96 BPM | WEIGHT: 140 LBS

## 2024-04-18 DIAGNOSIS — N63.0 BREAST MASS IN FEMALE: ICD-10-CM

## 2024-04-18 DIAGNOSIS — Z01.419 WOMEN'S ANNUAL ROUTINE GYNECOLOGICAL EXAMINATION: Primary | ICD-10-CM

## 2024-04-18 DIAGNOSIS — Z78.0 POSTMENOPAUSE: ICD-10-CM

## 2024-04-18 NOTE — TELEPHONE ENCOUNTER
Pt walked over to office and we scheduled her for DX Bilat Mammo & Left limited US at [facility] 4/25/24 @ 2 & 230pm.

## 2024-04-18 NOTE — PROGRESS NOTES
GYN Annual Exam     Chief Complaint   Patient presents with    Gynecologic Exam     F/u from breast mass   20 last pap        Teresita Romo is a 53 y.o. female who presents for annual well woman exam with a problem. She is postmenopausal. Prior hysterectomy for endometriosis and fibroids. She denies vaginal spotting or discharge. She completes SBE monthly. She is a patient of Dr. Quinn's. C/o left breast mass for several weeks. Denies pain or breast changes. She did have work up for left breast mass 2023 with negative imaging, mass resolved on it's own. Of note, pt is texting throughout our visit and did make a phone call as well.   OB History          3    Para   3    Term   3            AB        Living   3         SAB        IAB        Ectopic        Molar        Multiple        Live Births   3              Mammogram:  normal   Dexa scan: low frax  Colonoscopy:approx 2 years ago, up to date per pt  Last Pap :  NIL  History of abnormal Pap smear: no  Family history of uterine, colon or ovarian cancer: no  Family history of breast cancer: yes - Paternal aunt  History of abnormal mammogram: no    Menopause:  Bleeding since? no  Vasomotor symptoms: yes  HRT: no  Dyspareunia: no      Past Medical History:   Diagnosis Date    Asthma     Hypertension        Past Surgical History:   Procedure Laterality Date    BILATERAL OOPHORECTOMY       SECTION      TOTAL ABDOMINAL HYSTERECTOMY  2002    endometriosis, fibroids    WISDOM TOOTH EXTRACTION           Current Outpatient Medications:     albuterol sulfate  (90 Base) MCG/ACT inhaler, INHALE 2 PUFFS INTO THE LUNGS EVERY 4 HOURS AS NEEDED FOR WHEEZING OR SHORTNESS OF BREATH, Disp: , Rfl:     amphetamine-dextroamphetamine (ADDERALL) 10 MG tablet, , Disp: , Rfl:     amphetamine-dextroamphetamine (ADDERALL) 30 MG tablet, , Disp: , Rfl:     candesartan-hydrochlorothiazide (ATACAND HCT) 32-12.5 MG per tablet, Take  by mouth  "Daily., Disp: , Rfl:     cetirizine (ZyrTEC) 10 MG tablet, TK 1 T PO D PRN, Disp: , Rfl: 2    D-5000 125 MCG (5000 UT) tablet, Take 1 tablet by mouth Daily., Disp: , Rfl:     fluticasone (FLONASE) 50 MCG/ACT nasal spray, U 1 SPRAY IEN D, Disp: , Rfl: 0    montelukast (SINGULAIR) 10 MG tablet, TK 1 T PO ONCE A DAY, Disp: , Rfl: 1    Azelastine HCl 137 MCG/SPRAY solution, , Disp: , Rfl:     traZODone (DESYREL) 50 MG tablet, TK 1 T PO QD HS (Patient not taking: Reported on 4/18/2024), Disp: , Rfl: 1    Zinc 50 MG tablet, Take 1 tablet by mouth Daily. (Patient not taking: Reported on 4/18/2024), Disp: , Rfl:     Allergies   Allergen Reactions    Codeine Itching    Lavender Oil Itching    Vanilla Itching       Social History     Tobacco Use    Smoking status: Never    Smokeless tobacco: Never   Substance Use Topics    Alcohol use: Yes     Comment: social    Drug use: No       Family History   Problem Relation Age of Onset    Breast cancer Paternal Aunt         30-40    Ovarian cancer Neg Hx     Uterine cancer Neg Hx     Colon cancer Neg Hx     Deep vein thrombosis Neg Hx     Pulmonary embolism Neg Hx        Review of Systems   Constitutional:  Negative for chills, fatigue and fever.   Gastrointestinal:  Negative for abdominal distention, abdominal pain, nausea and vomiting.   Endocrine: Positive for heat intolerance. Negative for cold intolerance.   Genitourinary:  Negative for dyspareunia, dysuria, menstrual problem, pelvic pain, vaginal bleeding, vaginal discharge and vaginal pain.        + left breast mass  - breast pain  - breast dimpling  - nipple drainage   Musculoskeletal:  Negative for gait problem.   Skin:  Negative for rash.   Neurological:  Negative for dizziness and headaches.   Hematological:  Does not bruise/bleed easily.   Psychiatric/Behavioral:  Negative for behavioral problems.        /75   Pulse 96   Ht 157.5 cm (62.01\")   Wt 63.5 kg (140 lb)   LMP  (LMP Unknown)   BMI 25.60 kg/m² "     Physical Exam  Constitutional:       General: She is not in acute distress.     Appearance: Normal appearance. She is not ill-appearing, toxic-appearing or diaphoretic.   Genitourinary:      Vulva, bladder and urethral meatus normal.      No lesions in the vagina.      Right Labia: No rash, tenderness, lesions, skin changes or Bartholin's cyst.     Left Labia: No tenderness, lesions, skin changes, Bartholin's cyst or rash.     No labial fusion noted.      No inguinal adenopathy present in the right or left side.     Vaginal cuff intact.     No vaginal discharge, erythema, tenderness, bleeding or ulceration.      No vaginal prolapse present.     No vaginal atrophy present.       Right Adnexa: not tender, not full, not palpable, no mass present and not absent.     Left Adnexa: not tender, not full, not palpable, no mass present and not absent.     Cervix is absent.      Uterus is absent.      No urethral tenderness or mass present.      Pelvic exam was performed with patient in the lithotomy position.   Breasts:     Breasts are symmetrical.      Right: Present. No swelling, bleeding, inverted nipple, mass, nipple discharge, skin change, tenderness or breast implant.      Left: Present. No swelling, bleeding, inverted nipple, mass, nipple discharge, skin change, tenderness or breast implant.   HENT:      Head: Normocephalic and atraumatic.   Eyes:      Pupils: Pupils are equal, round, and reactive to light.   Cardiovascular:      Rate and Rhythm: Normal rate.   Pulmonary:      Effort: Pulmonary effort is normal.   Abdominal:      General: There is no distension.      Palpations: Abdomen is soft. There is no mass.      Tenderness: There is no abdominal tenderness. There is no guarding.      Hernia: No hernia is present. There is no hernia in the left inguinal area or right inguinal area.   Musculoskeletal:         General: Normal range of motion.      Cervical back: Normal range of motion and neck supple. No  tenderness.   Lymphadenopathy:      Cervical: No cervical adenopathy.      Upper Body:      Right upper body: No supraclavicular, axillary or pectoral adenopathy.      Left upper body: No supraclavicular, axillary or pectoral adenopathy.      Lower Body: No right inguinal adenopathy. No left inguinal adenopathy.   Neurological:      General: No focal deficit present.      Mental Status: She is alert and oriented to person, place, and time.      Cranial Nerves: No cranial nerve deficit.   Skin:     General: Skin is warm and dry.   Psychiatric:         Mood and Affect: Mood normal.         Behavior: Behavior normal.         Thought Content: Thought content normal.         Judgment: Judgment normal.   Vitals and nursing note reviewed.       Assessment    Diagnoses and all orders for this visit:    1. Women's annual routine gynecological examination (Primary)    2. Postmenopause    3. Breast mass in female  -     US Breast Left Limited; Future  -     Mammo Diagnostic Digital Tomosynthesis Bilateral With CAD; Future       Plan     1) Breast Health - Clinical breast exam & mammogram yearly, Self breast awareness. Schedule screening mammogram.   2) Pap - not indicated secondary to prior hysterectomy, and no hx of dysplasia   3) STD-no  4) Smoking status- nonsmoker  5) Colon health - screening colonoscopy recommended if not up to date  6) BMI 25.60  7) Bone health - Weight bearing exercise, dietary calcium recommendations and vitamin D  reviewed.   8) Encouraged 150 minutes of exercise per week if not medically contraindicated  9) Normal breast exam, unable to appreciate left breast mass. Will complete imaging to further evaluate    Return in about 1 year (around 4/18/2025) for Annual physical, With Dr Quinn.    Meghan Sylvester, DELONTE  4/18/2024  15:17 EDT

## 2024-04-25 ENCOUNTER — APPOINTMENT (OUTPATIENT)
Dept: WOMENS IMAGING | Facility: HOSPITAL | Age: 54
End: 2024-04-25
Payer: COMMERCIAL

## 2024-04-25 PROCEDURE — 77062 BREAST TOMOSYNTHESIS BI: CPT | Performed by: RADIOLOGY

## 2024-04-25 PROCEDURE — 76642 ULTRASOUND BREAST LIMITED: CPT | Performed by: RADIOLOGY

## 2024-04-25 PROCEDURE — G0279 TOMOSYNTHESIS, MAMMO: HCPCS | Performed by: RADIOLOGY

## 2024-04-25 PROCEDURE — 77066 DX MAMMO INCL CAD BI: CPT | Performed by: RADIOLOGY

## 2024-04-30 DIAGNOSIS — N63.0 BREAST MASS IN FEMALE: ICD-10-CM
